# Patient Record
(demographics unavailable — no encounter records)

---

## 2025-03-20 NOTE — PHYSICAL EXAM
[de-identified] : 1.5 cm mass left sternocleidomastoid within body of muscle at lower end, well circumscribed and mobile [de-identified] : no palpable thyroid nodules [Laryngoscopy Performed] : laryngoscopy was performed, see procedure section for findings [Midline] : located in midline position [Normal] : orientation to person, place, and time: normal

## 2025-03-20 NOTE — ASSESSMENT
[FreeTextEntry1] : will observe. sonogram requested. to call next week for results. to return earlier if any change. patient has been given the opportunity to ask questions, and all of the patient's questions have been answered to their satisfaction

## 2025-03-20 NOTE — HISTORY OF PRESENT ILLNESS
[de-identified] : prior evaluation of left neck mass. w/u c/w myositis ossificans. denies any symptoms. no changes medically since last visit. I have reviewed all old and new data and available images.